# Patient Record
Sex: MALE | Race: WHITE
[De-identification: names, ages, dates, MRNs, and addresses within clinical notes are randomized per-mention and may not be internally consistent; named-entity substitution may affect disease eponyms.]

---

## 2017-04-30 ENCOUNTER — HOSPITAL ENCOUNTER (EMERGENCY)
Dept: HOSPITAL 80 - FED | Age: 42
Discharge: HOME | End: 2017-04-30
Payer: COMMERCIAL

## 2017-04-30 VITALS — OXYGEN SATURATION: 98 % | SYSTOLIC BLOOD PRESSURE: 112 MMHG | HEART RATE: 99 BPM | DIASTOLIC BLOOD PRESSURE: 68 MMHG

## 2017-04-30 VITALS — TEMPERATURE: 97.9 F

## 2017-04-30 VITALS — RESPIRATION RATE: 16 BRPM

## 2017-04-30 DIAGNOSIS — N20.0: Primary | ICD-10-CM

## 2017-04-30 LAB
% IMMATURE GRANULYOCYTES: 0.4 % (ref 0–1.1)
ABSOLUTE IMMATURE GRANULOCYTES: 0.05 10^3/UL (ref 0–0.1)
ABSOLUTE NRBC COUNT: 0 10^3/UL (ref 0–0.01)
ADD DIFF?: NO
ADD MORPH?: NO
ADD SCAN?: NO
ANION GAP SERPL CALC-SCNC: 13 MEQ/L (ref 8–16)
ATYPICAL LYMPHOCYTE FLAG: 0 (ref 0–99)
CALCIUM SERPL-MCNC: 9.6 MG/DL (ref 8.5–10.4)
CHLORIDE SERPL-SCNC: 107 MEQ/L (ref 97–110)
CO2 SERPL-SCNC: 20 MEQ/L (ref 22–31)
COLOR UR: YELLOW
CREAT SERPL-MCNC: 1 MG/DL (ref 0.7–1.3)
ERYTHROCYTE [DISTWIDTH] IN BLOOD BY AUTOMATED COUNT: 13.2 % (ref 11.5–15.2)
FRAGMENT RBC FLAG: 0 (ref 0–99)
GFR SERPL CREATININE-BSD FRML MDRD: > 60 ML/MIN/{1.73_M2}
GLUCOSE SERPL-MCNC: 106 MG/DL (ref 70–100)
HCT VFR BLD CALC: 44.1 % (ref 40–51)
HGB BLD-MCNC: 15.3 G/DL (ref 13.7–17.5)
HYALINE CASTS #/AREA URNS LPF: (no result) /LPF (ref 0–1)
LEFT SHIFT FLG: 0 (ref 0–99)
LIPEMIA HEMOLYSIS FLAG: 90 (ref 0–99)
MCH RBC BLDCO QN: 29.8 PG (ref 27.9–34.1)
MCHC RBC AUTO-ENTMCNC: 34.7 G/DL (ref 32.4–36.7)
MCV RBC AUTO: 86 FL (ref 81.5–99.8)
MUCOUS THREADS #/AREA URNS LPF: (no result) /LPF
NITRITE UR QL STRIP: NEGATIVE
NRBC-AUTO%: 0 % (ref 0–0.2)
PH UR STRIP: 6 [PH] (ref 5–7.5)
PLATELET # BLD: 327 10^3/UL (ref 150–400)
PLATELET CLUMPS FLAG: 10 (ref 0–99)
PMV BLD AUTO: 9.1 FL (ref 8.7–11.7)
POTASSIUM SERPL-SCNC: 4 MEQ/L (ref 3.5–5.2)
RBC # BLD AUTO: 5.13 10^6/UL (ref 4.4–6.38)
RBC #/AREA URNS HPF: (no result) /HPF (ref 0–3)
SODIUM SERPL-SCNC: 140 MEQ/L (ref 134–144)
SP GR UR STRIP: 1.02 (ref 1–1.03)
WBC #/AREA URNS HPF: (no result) /HPF (ref 0–3)

## 2017-04-30 NOTE — EDPHY
H & P


Time Seen by Provider: 04/30/17 18:01


HPI/ROS: 





CHIEF COMPLAINT:  Right flank pain, vomiting





HISTORY OF PRESENT ILLNESS:  41-year-old male presents to the emergency 

department with abrupt onset of right flank pain that began at 4:00 p.m. today.

  The patient has a history of kidney stones and he feels that this is very 

similar with pain in the right flank radiating into the right groin and 

testicle area.  He had a kidney stone 1 year ago and was able to pass this on 

his own.  Prior to that it had been at least 7 or 8 years ago since his last 

kidney stone.  He has never had lithotripsy or required any stenting.  He 

states that he has been nauseous secondary to the pain is vomited twice.  No 

diarrhea.  No fevers or chills.  He has urinary being normally.  He denies 

dysuria, urgency or frequency. No gross hematuria per patient.





REVIEW OF SYSTEMS:


Constitutional:  No fever, no chills.


Eyes:  No double or blurry vision.


ENT:  No sore throat.


Respiratory:  No cough, no shortness of breath.


Cardiac:  No chest pain.


Gastrointestinal:  Abdominal pain as above.  Vomiting. No diarrhea.


Genitourinary:  No dysuria.


Musculoskeletal:  Right flank pain as above.  No neck pain.


Skin:  No rashes.


Neurological:  No headache.


Past Medical/Surgical History: 





Kidney stones


Social History: 





Single and lives in [place]


Smoking Status: Never smoked


Physical Exam: 





General Appearance:  Alert, no distress. 36.6, 123/75


Eyes:  Pupils equal and round.  Extraocular motions are all intact.


ENT:  Mouth:  Mucous membranes dry appearing.


Respiratory:  No wheezing, rhonchi, or rales, lungs are clear to auscultation.


Cardiovascular:  Regular rate and rhythm.


Gastrointestinal:  Abdomen is soft and nontender, no masses, no rebound or 

guarding, bowel sounds normal. Positive CVA tenderness on the right, none on 

the left.


Neurological:  Alert and oriented x 3, cranial nerves II through XII grossly 

intact


Skin:  Warm and dry, no rashes.


Musculoskeletal:  Nontender to palpate along the cervical, thoracic or lumbar 

spine.  Neck is supple.


Extremities:  Full range of motion and no peripheral edema.


Psychiatric:  Patient is oriented X 3, there is no agitation.


Constitutional: 


 Initial Vital Signs











Temperature (C)  36.6 C   04/30/17 17:51


 


Heart Rate  77   04/30/17 17:51


 


Respiratory Rate  18   04/30/17 17:51


 


Blood Pressure  123/75 H  04/30/17 17:51


 


O2 Sat (%)  95   04/30/17 17:51








 











O2 Delivery Mode               Room Air














Allergies/Adverse Reactions: 


 





No Known Allergies Allergy (Unverified 04/30/17 18:00)


 








Home Medications: 














 Medication  Instructions  Recorded


 


ETODOLAC  04/30/17


 


Tamsulosin HCl [Flomax] 0.4 mg PO DAILY #10 cap 04/30/17


 


oxyCODONE/APAP 5/325 [Percocet 1 - 2 tab PO Q4-6PRN PRN #15 tab 04/30/17





5/325]  














Medical Decision Making





- Diagnostics


Imaging Results: 


 Imaging Impressions





Abdomen/Pelvis Ultrasound  04/30/17 18:09


Impression:


1. Right kidney lower pole 1 cm infundibulum/calyceal calculus with mild right 

hydronephrosis.


2. No left hydronephrosis or left shadowing calculi.


3. Bladder is empty.


 


Findings and recommendations discussed with emergency department physician 

assistant, Kathleen Stephens PA-C at  1849 hours on April 39, 2017.


 


Final report concurs with initial preliminary interpretation.








Renal ultrasound reveals 1 cm distal ureteral calculi.  Mild hydronephrosis 

noted. This was reported to me by Dr. Alcala.


Imaging: Discussed imaging studies w/ On call Radiologist


ED Course/Re-evaluation: 





41-year-old male presents to the emergency department with right flank pain.  

The patient has a history of kidney stones.  To avoid radiation exposure, renal 

ultrasound was ordered which revealed a 1 cm distal right ureteral calculi with 

mild hydronephrosis.





The patient was initially given fentanyl and Zofran.  He was then given Toradol 

30 mg IV given his normal creatinine of 1.0.





Urinalysis reveals large blood without signs of infection





The patient continued to have pain and therefore was given 0.5 mg of IV 

Dilaudid.  He received IV normal saline.





Patient was given Flomax 0.4 mg p.o..





The patient is comfortable being discharged home.  He was given a urine 

strainer and urology referral.





He was instructed to return if he developed difficulty urinating, recurring 

flank pain, fever, or if he felt worse in any way.


Differential Diagnosis: 





Including but not limited to kidney stone, urinary tract infection, 

pyelonephritis, urinary retention





- Data Points


Laboratory Results: 


 Laboratory Results





 04/30/17 18:10 





 04/30/17 18:10 





 











  04/30/17 04/30/17 04/30/17





  18:10 18:10 18:10


 


WBC      11.53 10^3/uL H 10^3/uL





     (3.80-9.50) 


 


RBC      5.13 10^6/uL 10^6/uL





     (4.40-6.38) 


 


Hgb      15.3 g/dL g/dL





     (13.7-17.5) 


 


Hct      44.1 % %





     (40.0-51.0) 


 


MCV      86.0 fL fL





     (81.5-99.8) 


 


MCH      29.8 pg pg





     (27.9-34.1) 


 


MCHC      34.7 g/dL g/dL





     (32.4-36.7) 


 


RDW      13.2 % %





     (11.5-15.2) 


 


Plt Count      327 10^3/uL 10^3/uL





     (150-400) 


 


MPV      9.1 fL fL





     (8.7-11.7) 


 


Neut % (Auto)      70.6 % %





     (39.3-74.2) 


 


Lymph % (Auto)      19.8 % %





     (15.0-45.0) 


 


Mono % (Auto)      6.7 % %





     (4.5-13.0) 


 


Eos % (Auto)      1.6 % %





     (0.6-7.6) 


 


Baso % (Auto)      0.9 % %





     (0.3-1.7) 


 


Nucleat RBC Rel Count      0.0 % %





     (0.0-0.2) 


 


Absolute Neuts (auto)      8.14 10^3/uL H 10^3/uL





     (1.70-6.50) 


 


Absolute Lymphs (auto)      2.28 10^3/uL 10^3/uL





     (1.00-3.00) 


 


Absolute Monos (auto)      0.77 10^3/uL 10^3/uL





     (0.30-0.80) 


 


Absolute Eos (auto)      0.19 10^3/uL 10^3/uL





     (0.03-0.40) 


 


Absolute Basos (auto)      0.10 10^3/uL 10^3/uL





     (0.02-0.10) 


 


Absolute Nucleated RBC      0.00 10^3/uL 10^3/uL





     (0-0.01) 


 


Immature Gran %      0.4 % %





     (0.0-1.1) 


 


Immature Gran #      0.05 10^3/uL 10^3/uL





     (0.00-0.10) 


 


Sodium    140 mEq/L mEq/L  





    (134-144)  


 


Potassium    4.0 mEq/L mEq/L  





    (3.5-5.2)  


 


Chloride    107 mEq/L mEq/L  





    ()  


 


Carbon Dioxide    20 mEq/l L mEq/l  





    (22-31)  


 


Anion Gap    13 mEq/L mEq/L  





    (8-16)  


 


BUN    13 mg/dL mg/dL  





    (7-23)  


 


Creatinine    1.0 mg/dL mg/dL  





    (0.7-1.3)  


 


Estimated GFR    > 60   





    


 


Glucose    106 mg/dL H mg/dL  





    ()  


 


Calcium    9.6 mg/dL mg/dL  





    (8.5-10.4)  


 


Urine Color  YELLOW     





    


 


Urine Appearance  HAZY     





    


 


Urine pH  6.0     





   (5.0-7.5)   


 


Ur Specific Gravity  1.023     





   (1.002-1.030)   


 


Urine Protein  1+  H     





   (NEGATIVE)   


 


Urine Ketones  NEGATIVE     





   (NEGATIVE)   


 


Urine Blood  3+  H     





   (NEGATIVE)   


 


Urine Nitrate  NEGATIVE     





   (NEGATIVE)   


 


Urine Bilirubin  NEGATIVE     





   (NEGATIVE)   


 


Urine Urobilinogen  NEGATIVE EU EU    





   (0.2-1.0)   


 


Ur Leukocyte Esterase  NEGATIVE     





   (NEGATIVE)   


 


Urine RBC   /hpf H /hpf    





   (0-3)   


 


Urine WBC  1-3 /hpf /hpf    





   (0-3)   


 


Ur Epithelial Cells  TRACE /lpf /lpf    





   (NONE-1+)   


 


Hyaline Casts  1-5 /lpf /lpf    





   (0-1)   


 


Urine Mucus  2+ /lpf H /lpf    





   (NONE-1+)   


 


Urine Glucose  NEGATIVE     





   (NEGATIVE)   











Medications Given: 


 








Discontinued Medications





Fentanyl (Sublimaze)  50 mcg IVP EDNOW ONE


   Stop: 04/30/17 18:08


   Last Admin: 04/30/17 18:33 Dose:  50 mcg


Hydromorphone HCl (Dilaudid)  0.5 mg IVP EDNOW ONE


   Stop: 04/30/17 19:18


   Last Admin: 04/30/17 19:18 Dose:  0.5 mg


Sodium Chloride (Ns)  1,000 mls @ 0 mls/hr IV ONCE ONE


   PRN Reason: Wide Open


   Stop: 04/30/17 18:08


   Last Admin: 04/30/17 18:26 Dose:  1,000 mls


Sodium Chloride (Ns)  1,000 mls @ 0 mls/hr IV ONCE ONE


   PRN Reason: Wide Open


   Stop: 04/30/17 19:27


   Last Admin: 04/30/17 19:29 Dose:  1,000 mls


Ketorolac Tromethamine (Toradol)  30 mg IVP EDNOW ONE


   Stop: 04/30/17 18:48


   Last Admin: 04/30/17 19:05 Dose:  30 mg


Ondansetron HCl (Zofran)  4 mg IVP EDNOW ONE


   Stop: 04/30/17 18:08


   Last Admin: 04/30/17 18:28 Dose:  4 mg


Oxycodone/Acetaminophen (Percocet 5/325mg Prepack#4)  1 btl TAKEHOME EDNOW ONE


   Stop: 04/30/17 20:01


   Last Admin: 04/30/17 20:23 Dose:  1 btl


Tamsulosin HCl (Flomax)  0.4 mg PO EDNOW ONE


   Stop: 04/30/17 19:56


   Last Admin: 04/30/17 20:00 Dose:  0.4 mg








Departure





- Departure


Disposition: Home, Routine, Self-Care


Clinical Impression: 


 Kidney stone





Condition: Good


Instructions:  Oxycodone/Acetaminophen (By mouth), Kidney Stones (ED)


Additional Instructions: 


Strain your urine.  Flomax daily until you passed the stone.  Percocet for 

severe pain as directed.  Ibuprofen 600 mg every 8 hours as needed for pain.  

You should not however take any ibuprofen tonight since your given IV Toradol 

in the emergency department.





Return to the emergency department if you develop fever, difficulty urinating, 

worsening back or abdominal pain, or if you feel worse in any way.


Referrals: 


Drew Sky MD [Medical Doctor] - As per Instructions (Urologist on-call)


Stand Alone Forms:  School Excuse


Prescriptions: 


oxyCODONE/APAP 5/325 [Percocet 5/325] 1 - 2 tab PO Q4-6PRN PRN #15 tab


 PRN Reason: For Moderate To Severe Pain


Tamsulosin HCl [Flomax] 0.4 mg PO DAILY #10 cap

## 2018-04-17 ENCOUNTER — HOSPITAL ENCOUNTER (EMERGENCY)
Dept: HOSPITAL 80 - FED | Age: 43
Discharge: HOME | End: 2018-04-17
Payer: COMMERCIAL

## 2018-04-17 VITALS — SYSTOLIC BLOOD PRESSURE: 113 MMHG | DIASTOLIC BLOOD PRESSURE: 81 MMHG

## 2018-04-17 DIAGNOSIS — N20.0: Primary | ICD-10-CM

## 2018-04-17 NOTE — EDPHY
HPI/HX/ROS/PE/MDM


Narrative: 





CHIEF COMPLAINT: "Kidney pain"





HPI: The patient is a 43 y/o male with a history of kidney stones complaining 

of waxing and waning "kidney pain" onset 22:00 last night, about 14.5 hours 

ago. This is his third episode of similar pain and he was most recently here 

April 2017 for the same symptoms and diagnosed with a kidney stone by 

ultrasound. Prior stones have been diagnosed by CT and x-ray as well. When his 

symptoms began last night he took old Tamsulosin and Lodine. Upon assessment 

here his pain has significantly improved and he believes he has passed a stone. 

He had slightly darker urine, but denies gross hematuria, dysuria, or 

difficulty urinating. No fever. He says, "I'm very confident it's a kidney 

stone and I'm confident it's gone now." At this point, he is not seeking 

further diagnostic testing, but is requesting help with referral to urologist 

and medical documentation that he can provide to his school. 


 


REVIEW OF SYSTEMS:


Aside from elements discussed in the HPI, a comprehensive 10-point review of 

systems was reviewed and is negative.





PMH: Kidney stones. 





SOCIAL HISTORY: Former . Student. Wife at bedside. 





PHYSICAL EXAM:


General:Patient is alert, in no acute distress.


ENT:Eyes are normal to inspection.  ENT inspection normal.


Neck: Normal inspection.  Full range of motion.


Respiratory:No respiratory distress.  Breath sounds normal bilaterally.


Cardiovascular: Regular rate and rhythm.  Strong peripheral pulses.  Normal cap 

refill.


Abdomen:The abdomen is nontender to palpation. There are no peritoneal signs.


Back: Normal to inspection.  No tenderness to palpation.


Skin: Normal color.  No rash.  Warm and dry.


Extremities: Normal appearance.  Full range of motion.


Neuro: Oriented x3.  Normal motor function.  Normal sensory function.


ED Course: 


This is a 43 y/o male with a history of kidney stones who presents with a 14.5-

hour history of similar symptoms. Upon assessment, his pain has actually 

dissipated and he believes he recently passed a stone. Previous stones have 

been diagnosed with CT scans, ultrasounds, and x-rays. Since his symptoms have 

nearly resolved, he is declining further investigation of his pain apart from 

an abdominal x-ray and states he is confident it is a stone as it feels the 

same as prior episodes. Instead he is requesting urology referral and a school 

note. 60mg IM Toradol administered for pain.





Abdominal x-ray: 9x4mm right nephrolithiasis





Reassessed patient and discussed findings. His pain remains minimal. Patient 

will be discharged with standard kidney stone care and follow up instructions. 

He has prescriptions available at home to manage recurrent pain if needed. 

Return precautions discussed. He is comfortable with this plan. 





- Data Points


Imaging Results: 


 Imaging Impressions





Abdomen X-Ray  04/17/18 12:35


Impression: Right nephrolithiasis. If further characterization is considered 

indicated, noncontrast CT of the abdomen and pelvis could be obtained.











Imaging: Discussed imaging studies w/ On call Radiologist


Medications Given: 


 








Discontinued Medications





Ketorolac Tromethamine (Toradol)  60 mg IM EDNOW ONE


   Stop: 04/17/18 12:37


   Last Admin: 04/17/18 12:47 Dose:  60 mg








General


Time Seen by Provider: 04/17/18 12:23


Initial Vital Signs: 


 Initial Vital Signs











Temperature (C)  36.5 C   04/17/18 12:20


 


Heart Rate  86   04/17/18 12:20


 


Respiratory Rate  16   04/17/18 12:20


 


Blood Pressure  113/81 H  04/17/18 12:20


 


O2 Sat (%)  95   04/17/18 12:20








 











O2 Delivery Mode               Room Air














Allergies/Adverse Reactions: 


 





No Known Allergies Allergy (Unverified 04/30/17 18:00)


 








Home Medications: 














 Medication  Instructions  Recorded


 


ETODOLAC  04/30/17


 


Tamsulosin HCl [Flomax] 0.4 mg PO DAILY #10 cap 04/30/17


 


Albuterol  04/17/18


 


Tamsulosin HCl [Flomax] 0.4 mg PO DAILY #10 cap 04/17/18














Departure





- Departure


Disposition: Home, Routine, Self-Care


Clinical Impression: 


 Kidney stone on right side





Condition: Good


Instructions:  Kidney Stones (ED), How to Strain Your Urine (ED)


Additional Instructions: 


1. Strain urine to try and catch the kidney stone and bring this to the urology 

appointment.


2. Use Flomax as prescribed for kidney stone. 


3. You can take 800mg ibuprofen every 8 hours as needed for pain if not using 

other NSAIDs. You received a similar medication here, so do not take a dose of 

ibuprofen until later tonight. 


4. Followup with your urologist within one week. You've been referred to Dr. Chinchilla. 


5. Return to the emergency apartment for fever, severe pain, inability to 

urinate or other concerns.  


Referrals: 


CLAUDIO PAYTON,. [Primary Care Provider] - As per Instructions


Ton Chinchilla MD [Medical Doctor] - As per Instructions


Stand Alone Forms:  School Excuse


Prescriptions: 


Tamsulosin HCl [Flomax] 0.4 mg PO DAILY #10 cap


Report Scribed for: José Miguel Awan


Report Scribed by: Leslie Sage


Date of Report: 04/17/18


Time of Report: 12:29


Physician Review and Approval Statement: Portions of this note were transcribed 

by an ED scribe.  I personally performed the history, physical exam, and 

medical decision making; and confirm the accuracy of the information in the 

transcribed note.

## 2018-08-31 ENCOUNTER — HOSPITAL ENCOUNTER (EMERGENCY)
Dept: HOSPITAL 80 - FED | Age: 43
Discharge: HOME | End: 2018-08-31
Payer: COMMERCIAL

## 2018-08-31 VITALS — SYSTOLIC BLOOD PRESSURE: 175 MMHG | DIASTOLIC BLOOD PRESSURE: 57 MMHG

## 2018-08-31 DIAGNOSIS — N20.2: Primary | ICD-10-CM

## 2018-08-31 DIAGNOSIS — N13.30: ICD-10-CM

## 2018-08-31 LAB — PLATELET # BLD: 294 10^3/UL (ref 150–400)

## 2018-08-31 NOTE — EDPHY
H & P


Stated Complaint: Dark urine, flank pain, "testicle pain", hx kidney stones


Time Seen by Provider: 08/31/18 21:48


HPI/ROS: 





CHIEF COMPLAINT:  "I think I have a kidney stone" 





HISTORY OF PRESENT ILLNESS: 43-year-old male history of recurrent 

nephrolithiasis arrives via private vehicle complaining of right flank pain 

radiating to his right lower quadrant since this morning.  He notes similar 

pain a few days ago which resolved spontaneously.  States current symptoms feel 

like a kidney stone.  Positive nausea.  No vomiting.  No fever or chills.  No 

trauma.  No dysuria hematuria increased frequency.  








REVIEW OF SYSTEMS:


10 systems reviewed and negative with the exception of the elements mentioned 

in the history of present illness








PAST MEDICAL & SURGICAL  HISTORY:  recurrent nephrolithiasis    





SOCIAL HISTORY: Nonsmoker    











************


PHYSICAL EXAM





(Prior to examination, patient consented to physical exam, hands were washed 

and my usual and customary physical exam procedures followed)


1) GENERAL: Well-developed, well-nourished, alert and oriented.  Appears 

uncomfortable.


2) HEAD: Normocephalic, atraumatic


3) HEENT: Pupils equal, round, reactive to light bilaterally.  Sclera 

anicteric.  Nasopharynx, oropharynx, clear, no lesions.  Dry mucous membranes.


4) NECK: Full range of motion, no meningeal signs.


5) LUNGS: Clear auscultation bilaterally, no wheezes, no rhonchi, no 

retractions.   


6) HEART: Regular rate and rhythm, no murmur, no heave, no gallop.


7) ABDOMEN: No guarding, no rebound, no focal tenderness, negative McBurney's, 

negative Swanson's, negative Rovsing's, negative peritoneal sign,


8) MUSCULOSKELETAL: Moving all extremities, no focal areas of tenderness, no 

obvious trauma.  No peripheral edema or discoloration.


9) BACK: No CVA tenderness, no midline vertebral tenderness, no fluctuance, no 

step-off, no obvious trauma, no visual or palpable abnormality. 


10) SKIN: No rash, no petechiae. 


11) :  Normal male external genitalia bilateral testicles nontender, no high-

riding testicle, no deformity, bilateral cremasteric reflex present and brisk..








***************





DIFFERENTIAL DIAGNOSIS:  In no particular order including but not limited to 

nephrolithiasis, ureterolithiasis, pyelonephritis, testicular torsion, acute 

appendicitis 








- Personal History


Current Tetanus Diphtheria and Acellular Pertussis (TDAP): No





- Medical/Surgical History


Hx Asthma: Yes


Hx Chronic Respiratory Disease: No


Hx Diabetes: No


Hx Cardiac Disease: No


Hx Renal Disease: No


Hx Cirrhosis: No


Hx Alcoholism: No


Hx HIV/AIDS: No


Hx Splenectomy or Spleen Trauma: No


Other PMH: pmh: KIDNEY STONES, LOW BACK PAIN,.  PSH: NONE





- Social History


Smoking Status: Never smoked


Constitutional: 


 Initial Vital Signs











Temperature (C)  36.8 C   08/31/18 21:45


 


Heart Rate  100   08/31/18 21:45


 


Respiratory Rate  18   08/31/18 21:45


 


Blood Pressure  136/72 H  08/31/18 21:45


 


O2 Sat (%)  97   08/31/18 21:45








 











O2 Delivery Mode               Room Air














Allergies/Adverse Reactions: 


 





No Known Allergies Allergy (Unverified 08/31/18 21:45)


 








Home Medications: 














 Medication  Instructions  Recorded


 


ETODOLAC  04/30/17


 


Tamsulosin HCl [Flomax] 0.4 mg PO DAILY #10 cap 04/30/17


 


Albuterol  04/17/18


 


Tamsulosin HCl [Flomax] 0.4 mg PO DAILY #10 cap 04/17/18


 


Tamsulosin HCl [Flomax] 0.4 mg PO DAILY #7 cap 08/31/18














Medical Decision Making





- Diagnostics


Imaging Results: 


 Imaging Impressions





Abdomen/Pelvis CT  08/31/18 22:17


Impression:  


1. 5 x 3 mm distal right ureteral calculus with associated mild right 

hydronephrosis.


2. Bilateral nephrolithiasis more extensive on the right side.


3. See above report for additional findings.


 


Results called and discussed with LINDA RIZO on 8/31/2018 at 23:09.


 


 








Images reviewed myself


ED Course/Re-evaluation: 





10:04 p.m. I reviewed the patient's old medical records.  He has had multiple 

CTs in the past.  We discussed the dangers of cumulative radiation exposure.  

Patient requests repeat CT scanning this evening.  I believe him to have 

decision-making capacity.  I saw this patient independently based on 

established practice protocols.  Care of patient under supervision of secondary 

supervising physician Dr Rodríguez with whom I discussed case.





11:36 p.m.:  Re-evaluation, urinalysis is negative for pyuria bacteriuria.  

Discussed his imaging results showing  stone at the right UVJ.  He is 

asymptomatic at this time, pain is controlled.  He feels comfortable being 

discharged.  He has never followed up with a urologist.  Recommended and given 

Urology follow-up information.  Discharged with Flomax states that this has 

helped him in the past.  Doubt testicular pathology such as testicular torsion 

in the absence of abnormal objective testicular findings.  He feels comfortable 

being discharged.  My usual and customary urologic precautions and instructions 

provided.





- Data Points


Laboratory Results: 


 Laboratory Results





 08/31/18 22:00 





 08/31/18 22:00 





 











  08/31/18 08/31/18 08/31/18





  23:00 22:00 22:00


 


WBC      





    


 


RBC      





    


 


Hgb      





    


 


Hct      





    


 


MCV      





    


 


MCH      





    


 


MCHC      





    


 


RDW      





    


 


Plt Count      





    


 


MPV      





    


 


Neut % (Auto)      





    


 


Lymph % (Auto)      





    


 


Mono % (Auto)      





    


 


Eos % (Auto)      





    


 


Baso % (Auto)      





    


 


Nucleat RBC Rel Count      





    


 


Absolute Neuts (auto)      





    


 


Absolute Lymphs (auto)      





    


 


Absolute Monos (auto)      





    


 


Absolute Eos (auto)      





    


 


Absolute Basos (auto)      





    


 


Absolute Nucleated RBC      





    


 


Immature Gran %      





    


 


Immature Gran #      





    


 


Sodium      136 mEq/L mEq/L





     (135-145) 


 


Potassium      3.8 mEq/L mEq/L





     (3.3-5.0) 


 


Chloride      105 mEq/L mEq/L





     () 


 


Carbon Dioxide      22 mEq/l mEq/l





     (22-31) 


 


Anion Gap      9 mEq/L mEq/L





     (8-16) 


 


BUN      19 mg/dL mg/dL





     (7-23) 


 


Creatinine      1.0 mg/dL mg/dL





     (0.7-1.3) 


 


Estimated GFR      > 60 





    


 


Glucose      121 mg/dL H mg/dL





     () 


 


Calcium      9.5 mg/dL mg/dL





     (8.5-10.4) 


 


Total Bilirubin    1.1 mg/dL mg/dL  





    (0.1-1.4)  


 


Conjugated Bilirubin    0.1 mg/dL mg/dL  





    (0.0-0.5)  


 


Unconjugated Bilirubin    1.0 mg/dL mg/dL  





    (0.0-1.1)  


 


AST    23 IU/L IU/L  





    (17-59)  


 


ALT    33 IU/L IU/L  





    (21-72)  


 


Alkaline Phosphatase    64 IU/L IU/L  





    ()  


 


Total Protein    6.8 g/dL g/dL  





    (6.3-8.2)  


 


Albumin    4.3 g/dL g/dL  





    (3.5-5.0)  


 


Lipase    89 IU/L IU/L  





    ()  


 


Urine Color  YELLOW     





    


 


Urine Appearance  CLEAR     





    


 


Urine pH  6.0     





   (5.0-7.5)   


 


Ur Specific Gravity  1.006     





   (1.002-1.030)   


 


Urine Protein  NEGATIVE     





   (NEGATIVE)   


 


Urine Ketones  NEGATIVE     





   (NEGATIVE)   


 


Urine Blood  3+  H     





   (NEGATIVE)   


 


Urine Nitrate  NEGATIVE     





   (NEGATIVE)   


 


Urine Bilirubin  NEGATIVE     





   (NEGATIVE)   


 


Urine Urobilinogen  NEGATIVE EU EU    





   (0.2-1.0)   


 


Ur Leukocyte Esterase  NEGATIVE     





   (NEGATIVE)   


 


Urine RBC  Pending     





    


 


Urine WBC  Pending     





    


 


Ur Epithelial Cells  Pending     





    


 


Urine Glucose  NEGATIVE     





   (NEGATIVE)   














  08/31/18





  22:00


 


WBC  13.54 10^3/uL H 10^3/uL





   (3.80-9.50) 


 


RBC  4.50 10^6/uL 10^6/uL





   (4.40-6.38) 


 


Hgb  13.5 g/dL L g/dL





   (13.7-17.5) 


 


Hct  38.9 % L %





   (40.0-51.0) 


 


MCV  86.4 fL fL





   (81.5-99.8) 


 


MCH  30.0 pg pg





   (27.9-34.1) 


 


MCHC  34.7 g/dL g/dL





   (32.4-36.7) 


 


RDW  12.9 % %





   (11.5-15.2) 


 


Plt Count  294 10^3/uL 10^3/uL





   (150-400) 


 


MPV  8.7 fL fL





   (8.7-11.7) 


 


Neut % (Auto)  86.1 % H %





   (39.3-74.2) 


 


Lymph % (Auto)  8.3 % L %





   (15.0-45.0) 


 


Mono % (Auto)  4.5 % %





   (4.5-13.0) 


 


Eos % (Auto)  0.3 % L %





   (0.6-7.6) 


 


Baso % (Auto)  0.4 % %





   (0.3-1.7) 


 


Nucleat RBC Rel Count  0.0 % %





   (0.0-0.2) 


 


Absolute Neuts (auto)  11.65 10^3/uL H 10^3/uL





   (1.70-6.50) 


 


Absolute Lymphs (auto)  1.13 10^3/uL 10^3/uL





   (1.00-3.00) 


 


Absolute Monos (auto)  0.61 10^3/uL 10^3/uL





   (0.30-0.80) 


 


Absolute Eos (auto)  0.04 10^3/uL 10^3/uL





   (0.03-0.40) 


 


Absolute Basos (auto)  0.06 10^3/uL 10^3/uL





   (0.02-0.10) 


 


Absolute Nucleated RBC  0.00 10^3/uL 10^3/uL





   (0-0.01) 


 


Immature Gran %  0.4 % %





   (0.0-1.1) 


 


Immature Gran #  0.05 10^3/uL 10^3/uL





   (0.00-0.10) 


 


Sodium  





  


 


Potassium  





  


 


Chloride  





  


 


Carbon Dioxide  





  


 


Anion Gap  





  


 


BUN  





  


 


Creatinine  





  


 


Estimated GFR  





  


 


Glucose  





  


 


Calcium  





  


 


Total Bilirubin  





  


 


Conjugated Bilirubin  





  


 


Unconjugated Bilirubin  





  


 


AST  





  


 


ALT  





  


 


Alkaline Phosphatase  





  


 


Total Protein  





  


 


Albumin  





  


 


Lipase  





  


 


Urine Color  





  


 


Urine Appearance  





  


 


Urine pH  





  


 


Ur Specific Gravity  





  


 


Urine Protein  





  


 


Urine Ketones  





  


 


Urine Blood  





  


 


Urine Nitrate  





  


 


Urine Bilirubin  





  


 


Urine Urobilinogen  





  


 


Ur Leukocyte Esterase  





  


 


Urine RBC  





  


 


Urine WBC  





  


 


Ur Epithelial Cells  





  


 


Urine Glucose  





  











Medications Given: 


 








Discontinued Medications





Lidocaine HCl 100 mg/ Sodium (Chloride)  110 mls @ 600 mls/hr IV EDNOW ONE


   Stop: 08/31/18 22:03


   Last Admin: 08/31/18 22:36 Dose:  110 mls


Sodium Chloride (Ns)  1,000 mls @ 3,000 mls/hr IV EDNOW ONE


   Stop: 08/31/18 22:12


   Last Admin: 08/31/18 22:05 Dose:  1,000 mls








Departure





- Departure


Disposition: Home, Routine, Self-Care


Clinical Impression: 


 Nephrolithiasis, Ureterolithiasis





Condition: Good


Instructions:  Kidney Stones (ED)


Additional Instructions: 


Seek immediate medical attention if you develop new or worsening symptoms, if 

you develop fevers, chills, inability to tolerate oral intake or any other 

symptoms that concerns you.


Referrals: 


Haider Akins MD [Medical Doctor] - As per Instructions


Prescriptions: 


Tamsulosin HCl [Flomax] 0.4 mg PO DAILY #7 cap